# Patient Record
Sex: FEMALE | Race: WHITE
[De-identification: names, ages, dates, MRNs, and addresses within clinical notes are randomized per-mention and may not be internally consistent; named-entity substitution may affect disease eponyms.]

---

## 2019-03-14 ENCOUNTER — HOSPITAL ENCOUNTER (OUTPATIENT)
Dept: HOSPITAL 80 - FIMAGING | Age: 49
End: 2019-03-14
Attending: INTERNAL MEDICINE
Payer: MEDICAID

## 2019-03-14 DIAGNOSIS — R92.8: Primary | ICD-10-CM

## 2019-03-20 ENCOUNTER — HOSPITAL ENCOUNTER (OUTPATIENT)
Dept: HOSPITAL 80 - FSGY | Age: 49
Setting detail: OBSERVATION
LOS: 1 days | Discharge: HOME | End: 2019-03-21
Attending: SURGERY | Admitting: PHYSICIAN ASSISTANT
Payer: MEDICAID

## 2019-03-20 DIAGNOSIS — M85.80: ICD-10-CM

## 2019-03-20 DIAGNOSIS — F32.9: ICD-10-CM

## 2019-03-20 DIAGNOSIS — N20.0: ICD-10-CM

## 2019-03-20 DIAGNOSIS — E21.3: Primary | ICD-10-CM

## 2019-03-20 PROCEDURE — 0GTN0ZZ RESECTION OF RIGHT INFERIOR PARATHYROID GLAND, OPEN APPROACH: ICD-10-PCS | Performed by: SURGERY

## 2019-03-20 RX ADMIN — Medication PRN MCG: at 09:17

## 2019-03-20 RX ADMIN — Medication PRN MCG: at 09:58

## 2019-03-20 RX ADMIN — HYDROCODONE BITARTRATE AND ACETAMINOPHEN PRN TAB: 5; 325 TABLET ORAL at 16:04

## 2019-03-20 RX ADMIN — HYDROCODONE BITARTRATE AND ACETAMINOPHEN PRN TAB: 5; 325 TABLET ORAL at 10:41

## 2019-03-20 RX ADMIN — HYDROCODONE BITARTRATE AND ACETAMINOPHEN PRN TAB: 5; 325 TABLET ORAL at 11:29

## 2019-03-20 NOTE — GOP
[f rep st]



                                                                OPERATIVE REPORT





DATE OF OPERATION:  03/20/2019



SURGEON:  Patt Lozano MD



ASSISTANT:  Haydee Johnson MD



ANESTHESIA:  Dr. Andrea Vargas/general.



PREOPERATIVE DIAGNOSIS:  Parathyroid adenoma.



POSTOPERATIVE DIAGNOSIS:  Parathyroid adenoma.



PROCEDURE PERFORMED:  Right inferior parathyroidectomy.



FINDINGS:  Two glands on the right inferior pole, the one more lateral was not hypercellular and the 
one that was behind the trachea was more firm.



SPECIMENS:  PTH initial at 6:24 107, at 8:18 26 and at 8:28 16.5, and parathyroid glands for patholog
y.



ESTIMATED BLOOD LOSS:  5 cc.



INDICATIONS:  The patient is a 49-year-old woman who has had hyperparathyroidism.  She also had assoc
iated stones and malaise and depressive symptoms.  Initially performed a sestamibi scan and ultrasoun
d which did not find an adenoma.  We then performed a CT scan of her neck and an enlarged gland was f
ound in the right inferior pole behind the trachea.



DESCRIPTION OF PROCEDURE:  The patient was brought into the operating room, placed supine on the tabl
e, and general anesthesia was administered.  Her neck was extended, a bump placed behind her shoulder
s, and she was placed in the modified beach chair position.  Her neck was prepped and draped in the u
sual sterile fashion.  I infiltrated the area with 0.5% Marcaine prior to making incisions.  I made a
n incision approximately 2 fingerbreadths above the sternal notch.  I dissected down through the skin
, subcutaneous tissues and divided the platysma.  I then created platysma flaps.  I continued my diss
ection until I encountered the strap muscles; I divided these vertically and spread them.  I rolled h
er thyroid grand laterally; I had to ligate the inferior thyroid artery in order to roll this.  There
 was a bulging dyer-yellow tissue in the right inferior pole; this was carefully dissected free and
 the small vessel leading to it was clipped.  This was sent to Pathology.  It was not very large in s
ize, and so I continued my dissection rolling the thyroid more to the left side and found another are
a of tissue behind the trachea.  I also identified the recurrent laryngeal nerve and protected this. 
 I carefully dissected away the nodule and again clipped the bleeding artery.  I used a combination o
f electrocautery and bipolar.  This was submitted to Pathology and her parathyroid was sent 10 and 20
 minutes after the tissue was sent to Pathology.  The pathology was unable to be declared as an adeno
ma; however, her PTH dropped from 107.4 to 16.5.  Hemostasis was meticulously established.  The strap
 muscles were reapproximated with 3-0 Vicryl.  Platysma approximated with 3-0 Vicryl and the skin kalpesh
sed with 4-0 Monocryl.  Mastisol, Steri-Strips, and sterile dressing were applied.  She was awakened 
in the operating room, extubated, and transferred to PACU in stable condition.





Job #:  [id number]

## 2019-03-20 NOTE — PDANEPAE
ANE History of Present Illness





here for parathyroidectomy 





ANE Past Medical History





- Cardiovascular History


Hx Hypertension: No


Hx Arrhythmias: No


Hx Chest Pain: No


Hx Coronary Artery / Peripheral Vascular Disease: No


Hx CHF / Valvular Disease: No


Hx Palpitations: No





- Pulmonary History


Hx COPD: No


Hx Asthma/Reactive Airway Disease: No


Hx Recent Upper Respiratory Infection: No


Hx Oxygen in Use at Home: No


Hx Sleep Apnea: No


Sleep Apnea Screening Result - Last Documented: Negative





- Neurologic History


Hx Cerebrovascular Accident: No


Hx Seizures: No


Hx Dementia: No





- Endocrine History


Hx Diabetes: No





- Renal History


Hx Renal Disorders: No





- Liver History


Hx Hepatic Disorders: No





- Neurological & Psychiatric Hx


Hx Neurological and Psychiatric Disorders: Yes


Neurological / Psychiatric History Comment: DEPRESSION





- Cancer History


Hx Cancer: No





- Congenital Disorder History


Hx Congenital Disorders: No





- GI History


Hx Gastrointestinal Disorders: No





- Other Health History


Other Health History: OSTEOPENIA.  HYPERPARATHYROIDISM.  MISSING UPPER TEETH/

RETAINER





- Chronic Pain History


Chronic Pain: No





- Surgical History


Prior Surgeries: RT THUMB TENDON REPAIR.  





ANE Review of Systems


Review of systems is: negative


Review of Systems: 








- Exercise capacity


Exercise capacity: >=4 METS


METS (RN): 6 METS





ANE Patient History





- Allergies


Allergies/Adverse Reactions: 








No Known Allergies Allergy (Verified 13 11:08)


 








- Home Medications


Home medications: home medication list seen and reviewed


Home Medications: 








Citalopram DAILY 19 [Last Taken 19 05:45]








- NPO status


NPO Status: no food or drink >8 hours


NPO Since - Liquids (Date): 19


NPO Since - Liquids (Time): 21:00


NPO Since - Solids (Date): 19


NPO Since - Solids (Time): 19:30





- Anes Hx


Anes Hx: no prior problems





- Smoking Hx


Smoking Status: Former smoker





ANE Labs/Vital Signs





- Vital Signs


Vital Signs: reviewed preoperatively; see RN documention for details


Blood Pressure: 119/81


Heart Rate: 58


Respiratory Rate: 18


O2 Sat (%): 99


Height: 162.56 cm


Weight: 49.895 kg





ANE Physical Exam





- Airway


Neck exam: FROM


Mallampati Score: Class 1


Mouth exam: normal dental/mouth exam





- Pulmonary


Pulmonary: no respiratory distress





- Cardiovascular


Cardiovascular: regular rate and rhythym





- ASA Status


ASA Status: I





ANE Anesthesia Plan


Anesthesia Plan: general endotracheal anesthesia

## 2019-03-20 NOTE — ASMTCMCOM
CM Note

 

CM Note                       

Notes:

Pt is s/p a planned R inferior parathyroidectomy. Anticipate she will d/c home independent with her 


 when medically cleared. CM will continue to follow for any change in needs.



D/C plan: anticiipate home independent

 

Date Signed:  03/20/2019 04:41 PM

Electronically Signed By:HUBERT Tyler

## 2019-03-20 NOTE — POSTOPPROG
Post Op Note


Date of Operation: 03/20/19


Surgeon: Patt Lozano


Assistant: edgardo baltazar PA-C


Anesthesiologist: [email or web address]


Anesthesia: GET(General Endotracheal)


Pre-op Diagnosis: parathyroid adenoma


Post-op Diagnosis: same, right lower pole


Indication: 48yo F with nephrolithiasis, hypercalc found to have parathy adenoma


Procedure: R inf pole parathyroidectomy


Findings: preop .4 --> 10 min post 26.0


Inf/Abcess present in the surg proc area at time of surgery?: No


EBL: Minimal


Complications: 





none immediately postoperatively   


Bowel Protocol: N/A


Clean Closure Performed: N/A


Specimen(s): 





R inferior pole parathyroid

## 2019-03-21 VITALS — DIASTOLIC BLOOD PRESSURE: 66 MMHG | SYSTOLIC BLOOD PRESSURE: 97 MMHG

## 2019-03-21 RX ADMIN — HYDROCODONE BITARTRATE AND ACETAMINOPHEN PRN TAB: 5; 325 TABLET ORAL at 00:12

## 2019-03-21 NOTE — SOAPPROG
SOAP Progress Note


Assessment/Plan: 


Assessment:





POD # 1 s/p parathyroidectomy x 2


Ca 8.8 and PTH 2


Can dc home

















Plan:





03/21/19 08:46


POD # 1 s/p parathyroidectomy


Ca 8.8 and PTH 27





Tolerating diet 


Can DC home





F/U with labs in 1 week





S:





O: 


Sitting in bed


No increased work of breathing


RRR


Incision CDI


03/21/19 08:58





Objective: 





 Vital Signs











Temp Pulse Resp BP Pulse Ox


 


 36.8 C   78   16   91/55 L  94 


 


 03/21/19 03:16  03/21/19 03:16  03/21/19 03:16  03/21/19 03:16  03/21/19 03:16








 











 03/20/19 03/21/19 03/22/19





 05:59 05:59 05:59


 


Intake Total  2350 


 


Output Total  10 


 


Balance  2340 














ICD10 Worksheet


Patient Problems: 


 Problems











Problem Status Onset


 


Hyperparathyroidism Acute  














- ICD10 Problem Qualifiers


(1) Hyperparathyroidism

## 2019-03-21 NOTE — ASMTLACE
LACE

 

Length of stay for            Answers:  Less than 1 day                       

current admission                                                             

Acuity / Level of             Answers:  No                                    

Care: Did the patient                                                         

have an inpatient                                                             

admission?                                                                    

# of Emergency department     Answers:  0                                     

visits in the last 6                                                          

months                                                                        

Social determinants           Answers:  Mental health diagnosis               

                                        (anxiety, depression, pers            

                                        onality disorders, etc.)              

Score: 3

 

Date Signed:  03/21/2019 09:37 AM

Electronically Signed By:Eugenia Pollack RN

## 2019-03-21 NOTE — ASDISCHSUM
----------------------------------------------

Discharge Information

----------------------------------------------

Plan Status:Home with No Needs                       Medically Cleared to Leave:

Discharge Date:                                       D/C Disposition:Home, Routine, Self-Care

ADT D/C Disposition:                                 Projected Discharge Date:

Transportation at D/C:Family                         Discharge Delay Reason:

Follow-Up Date:                                      Discharge Slot:

Final Diagnosis:

----------------------------------------------

Placement Information

----------------------------------------------

----------------------------------------------

Patient Contact Information

----------------------------------------------

Contact Name:ROBERT                               Relationship:

Address:[address] [place] [address]                             Home [phone]

                                                     Work Phone:

City:[place]                                       Alternate Phone:

State/Zip Code:[place] [address]                              Email:

----------------------------------------------

Financial Information

----------------------------------------------

Financial Class:Medicaid

Primary Plan Desc:[employer] [facility]        Primary Plan Number:[id number]

Secondary Plan Desc:                                 Secondary Plan Number:

 

 

----------------------------------------------

Assessment Information

----------------------------------------------

----------------------------------------------

LACE

----------------------------------------------

LACE

 

Length of stay for            Answers:  Less than 1 day                       

current admission                                                             

Acuity / Level of             Answers:  No                                    

Care: Did the patient                                                         

have an inpatient                                                             

admission?                                                                    

# of Emergency department     Answers:  0                                     

visits in the last 6                                                          

months                                                                        

Social determinants           Answers:  Mental health diagnosis               

                                        (anxiety, depression, pers            

                                        onality disorders, etc.)              

Score: 3

 

Date Signed:  03/21/2019 09:37 AM

Electronically Signed By:Eugenia Pollack RN

 

 

----------------------------------------------

[facility] Progress Note

----------------------------------------------

CM Note

 

CM Note                       

Notes:

Pt is s/p a planned R inferior parathyroidectomy. Anticipate she will d/c home independent with her 


 when medically cleared. CM will continue to follow for any change in needs.



D/C plan: anticiipate home independent

 

Date Signed:  03/20/2019 04:41 PM

Electronically Signed By:HUBERT Tyler

 

 

----------------------------------------------

Intervention Information

----------------------------------------------

## 2019-03-25 NOTE — GDS
[f rep st]



                                                             DISCHARGE SUMMARY





ADMITTING DIAGNOSIS:  Hyperparathyroidism.



SECONDARY DIAGNOSES:  Depression, hypercalcemia, nephrolithiasis, osteopenia.



REASON FOR HOSPITALIZATION:  49-year-old woman who has a history of nephrolithiasis and upon workup w
as found to have hyperparathyroidism.  She was admitted at this time for surgical intervention, pain 
control and observation.



HOSPITAL COURSE:  She was taken to the operating room by Dr. Patt Lozano on 03/20/2019 for parathyroi
dectomy.  Intraoperative lab findings:  Preoperative .4 and 10 minutes post parathyroidectomy 
PTH 26.  She was admitted overnight for pain control and observation.  On postoperative day #1, her p
ain is well controlled with oral pain medication.  She is tolerating regular diet.  No difficulties b
reathing or swallowing.  Her calcium was within normal limits.  Her PTH was 27.  [facility]



DISCHARGE CONDITION:  Discharged home in stable condition.  Pain well controlled.  Tolerating regular
 diet.  Ambulating independently.



DISCHARGE MEDICATIONS:  Home with new prescription for Norco.  Instructed to resume home medications.
  Please see EMR for further.



DISCHARGE INSTRUCTIONS AND FOLLOWUP:  Parathyroid postop.  She may take Tums once or twice per day.  
Instructed to call with any worsening numbness or tingling around lips or fingers.  Call with difficu
lty swallowing, change in voice or other concerns.  She will follow up in the clinic in 1 week.  She 
will get labs obtained prior to her office visit.  Call with any worsening symptoms, questions or con
cerns.





Job #:  [id number]